# Patient Record
Sex: FEMALE | Race: AMERICAN INDIAN OR ALASKA NATIVE | Employment: UNEMPLOYED | URBAN - METROPOLITAN AREA
[De-identification: names, ages, dates, MRNs, and addresses within clinical notes are randomized per-mention and may not be internally consistent; named-entity substitution may affect disease eponyms.]

---

## 2021-11-22 ENCOUNTER — HOSPITAL ENCOUNTER (EMERGENCY)
Facility: HOSPITAL | Age: 46
Discharge: HOME | End: 2021-11-22
Attending: EMERGENCY MEDICINE | Admitting: EMERGENCY MEDICINE
Payer: COMMERCIAL

## 2021-11-22 ENCOUNTER — APPOINTMENT (OUTPATIENT)
Dept: CT IMAGING | Facility: HOSPITAL | Age: 46
End: 2021-11-22
Attending: EMERGENCY MEDICINE
Payer: COMMERCIAL

## 2021-11-22 VITALS
SYSTOLIC BLOOD PRESSURE: 119 MMHG | DIASTOLIC BLOOD PRESSURE: 75 MMHG | WEIGHT: 167 LBS | TEMPERATURE: 97.9 F | OXYGEN SATURATION: 93 % | RESPIRATION RATE: 20 BRPM | HEART RATE: 85 BPM

## 2021-11-22 DIAGNOSIS — R10.13 EPIGASTRIC PAIN: ICD-10-CM

## 2021-11-22 DIAGNOSIS — R11.2 NAUSEA AND VOMITING, INTRACTABILITY OF VOMITING NOT SPECIFIED, UNSPECIFIED VOMITING TYPE: ICD-10-CM

## 2021-11-22 DIAGNOSIS — K44.9 HIATAL HERNIA: ICD-10-CM

## 2021-11-22 LAB
ALBUMIN SERPL-MCNC: 4 G/DL (ref 3.5–5)
ALP SERPL-CCNC: 117 U/L (ref 45–120)
ALT SERPL W P-5'-P-CCNC: 16 U/L (ref 0–45)
ANION GAP SERPL CALCULATED.3IONS-SCNC: 12 MMOL/L (ref 5–18)
AST SERPL W P-5'-P-CCNC: 15 U/L (ref 0–40)
BASE EXCESS BLDV CALC-SCNC: 9.7 MMOL/L
BASOPHILS # BLD AUTO: 0 10E3/UL (ref 0–0.2)
BASOPHILS NFR BLD AUTO: 0 %
BILIRUB DIRECT SERPL-MCNC: 0.2 MG/DL
BILIRUB SERPL-MCNC: 0.6 MG/DL (ref 0–1)
BUN SERPL-MCNC: 13 MG/DL (ref 8–22)
C REACTIVE PROTEIN LHE: 1 MG/DL (ref 0–0.8)
CALCIUM SERPL-MCNC: 9.7 MG/DL (ref 8.5–10.5)
CHLORIDE BLD-SCNC: 96 MMOL/L (ref 98–107)
CO2 SERPL-SCNC: 30 MMOL/L (ref 22–31)
CREAT SERPL-MCNC: 0.84 MG/DL (ref 0.6–1.1)
EOSINOPHIL # BLD AUTO: 0.1 10E3/UL (ref 0–0.7)
EOSINOPHIL NFR BLD AUTO: 1 %
ERYTHROCYTE [DISTWIDTH] IN BLOOD BY AUTOMATED COUNT: 14.6 % (ref 10–15)
GFR SERPL CREATININE-BSD FRML MDRD: 83 ML/MIN/1.73M2
GLUCOSE BLD-MCNC: 160 MG/DL (ref 70–125)
HCG SERPL QL: NEGATIVE
HCO3 BLDV-SCNC: 32 MMOL/L (ref 24–30)
HCT VFR BLD AUTO: 47 % (ref 35–47)
HGB BLD-MCNC: 15.3 G/DL (ref 11.7–15.7)
IMM GRANULOCYTES # BLD: 0 10E3/UL
IMM GRANULOCYTES NFR BLD: 0 %
KETONES BLD-SCNC: 1.13 MMOL/L
LIPASE SERPL-CCNC: 51 U/L (ref 0–52)
LYMPHOCYTES # BLD AUTO: 3.7 10E3/UL (ref 0.8–5.3)
LYMPHOCYTES NFR BLD AUTO: 31 %
MAGNESIUM SERPL-MCNC: 1.7 MG/DL (ref 1.8–2.6)
MCH RBC QN AUTO: 26.4 PG (ref 26.5–33)
MCHC RBC AUTO-ENTMCNC: 32.6 G/DL (ref 31.5–36.5)
MCV RBC AUTO: 81 FL (ref 78–100)
MONOCYTES # BLD AUTO: 0.6 10E3/UL (ref 0–1.3)
MONOCYTES NFR BLD AUTO: 5 %
NEUTROPHILS # BLD AUTO: 7.6 10E3/UL (ref 1.6–8.3)
NEUTROPHILS NFR BLD AUTO: 63 %
NRBC # BLD AUTO: 0 10E3/UL
NRBC BLD AUTO-RTO: 0 /100
OXYHGB MFR BLDV: 65.6 % (ref 70–75)
PCO2 BLDV: 42 MM HG (ref 35–50)
PH BLDV: 7.5 [PH] (ref 7.35–7.45)
PLATELET # BLD AUTO: 383 10E3/UL (ref 150–450)
PO2 BLDV: 33 MM HG (ref 25–47)
POTASSIUM BLD-SCNC: 3.4 MMOL/L (ref 3.5–5)
PROT SERPL-MCNC: 7.7 G/DL (ref 6–8)
RBC # BLD AUTO: 5.8 10E6/UL (ref 3.8–5.2)
SAO2 % BLDV: 67.1 % (ref 70–75)
SODIUM SERPL-SCNC: 138 MMOL/L (ref 136–145)
TSH SERPL DL<=0.005 MIU/L-ACNC: 1.11 UIU/ML (ref 0.3–5)
WBC # BLD AUTO: 12 10E3/UL (ref 4–11)

## 2021-11-22 PROCEDURE — 83735 ASSAY OF MAGNESIUM: CPT | Performed by: EMERGENCY MEDICINE

## 2021-11-22 PROCEDURE — 36415 COLL VENOUS BLD VENIPUNCTURE: CPT | Performed by: EMERGENCY MEDICINE

## 2021-11-22 PROCEDURE — 96361 HYDRATE IV INFUSION ADD-ON: CPT

## 2021-11-22 PROCEDURE — 82248 BILIRUBIN DIRECT: CPT | Performed by: EMERGENCY MEDICINE

## 2021-11-22 PROCEDURE — 86141 C-REACTIVE PROTEIN HS: CPT | Performed by: EMERGENCY MEDICINE

## 2021-11-22 PROCEDURE — 84443 ASSAY THYROID STIM HORMONE: CPT | Mod: GZ | Performed by: EMERGENCY MEDICINE

## 2021-11-22 PROCEDURE — 250N000013 HC RX MED GY IP 250 OP 250 PS 637: Performed by: EMERGENCY MEDICINE

## 2021-11-22 PROCEDURE — 96375 TX/PRO/DX INJ NEW DRUG ADDON: CPT

## 2021-11-22 PROCEDURE — 93005 ELECTROCARDIOGRAM TRACING: CPT | Performed by: EMERGENCY MEDICINE

## 2021-11-22 PROCEDURE — 99285 EMERGENCY DEPT VISIT HI MDM: CPT | Mod: 25

## 2021-11-22 PROCEDURE — 84703 CHORIONIC GONADOTROPIN ASSAY: CPT | Performed by: EMERGENCY MEDICINE

## 2021-11-22 PROCEDURE — 250N000011 HC RX IP 250 OP 636: Performed by: EMERGENCY MEDICINE

## 2021-11-22 PROCEDURE — 80053 COMPREHEN METABOLIC PANEL: CPT | Performed by: EMERGENCY MEDICINE

## 2021-11-22 PROCEDURE — 258N000003 HC RX IP 258 OP 636: Performed by: EMERGENCY MEDICINE

## 2021-11-22 PROCEDURE — 82805 BLOOD GASES W/O2 SATURATION: CPT | Performed by: EMERGENCY MEDICINE

## 2021-11-22 PROCEDURE — 250N000009 HC RX 250: Performed by: EMERGENCY MEDICINE

## 2021-11-22 PROCEDURE — 74177 CT ABD & PELVIS W/CONTRAST: CPT

## 2021-11-22 PROCEDURE — 96374 THER/PROPH/DIAG INJ IV PUSH: CPT | Mod: 59

## 2021-11-22 PROCEDURE — 85025 COMPLETE CBC W/AUTO DIFF WBC: CPT | Performed by: EMERGENCY MEDICINE

## 2021-11-22 PROCEDURE — 82010 KETONE BODYS QUAN: CPT | Performed by: EMERGENCY MEDICINE

## 2021-11-22 PROCEDURE — 83690 ASSAY OF LIPASE: CPT | Performed by: EMERGENCY MEDICINE

## 2021-11-22 RX ORDER — HALOPERIDOL 5 MG/ML
2.5 INJECTION INTRAMUSCULAR ONCE
Status: COMPLETED | OUTPATIENT
Start: 2021-11-22 | End: 2021-11-22

## 2021-11-22 RX ORDER — IOPAMIDOL 755 MG/ML
100 INJECTION, SOLUTION INTRAVASCULAR ONCE
Status: COMPLETED | OUTPATIENT
Start: 2021-11-22 | End: 2021-11-22

## 2021-11-22 RX ORDER — METOCLOPRAMIDE 10 MG/1
10 TABLET ORAL 4 TIMES DAILY PRN
Qty: 21 TABLET | Refills: 0 | Status: SHIPPED | OUTPATIENT
Start: 2021-11-22

## 2021-11-22 RX ORDER — PANTOPRAZOLE SODIUM 40 MG/1
40 TABLET, DELAYED RELEASE ORAL DAILY
Qty: 30 TABLET | Refills: 0 | Status: SHIPPED | OUTPATIENT
Start: 2021-11-22 | End: 2021-12-22

## 2021-11-22 RX ORDER — PROMETHAZINE HYDROCHLORIDE 25 MG/1
25 SUPPOSITORY RECTAL EVERY 6 HOURS PRN
Qty: 21 SUPPOSITORY | Refills: 0 | Status: SHIPPED | OUTPATIENT
Start: 2021-11-22

## 2021-11-22 RX ORDER — ONDANSETRON 2 MG/ML
4 INJECTION INTRAMUSCULAR; INTRAVENOUS ONCE
Status: COMPLETED | OUTPATIENT
Start: 2021-11-22 | End: 2021-11-22

## 2021-11-22 RX ADMIN — HYDROMORPHONE HYDROCHLORIDE 0.5 MG: 1 INJECTION, SOLUTION INTRAMUSCULAR; INTRAVENOUS; SUBCUTANEOUS at 21:27

## 2021-11-22 RX ADMIN — IOPAMIDOL 100 ML: 755 INJECTION, SOLUTION INTRAVENOUS at 20:31

## 2021-11-22 RX ADMIN — ONDANSETRON 4 MG: 2 INJECTION INTRAMUSCULAR; INTRAVENOUS at 18:56

## 2021-11-22 RX ADMIN — HALOPERIDOL LACTATE 2.5 MG: 5 INJECTION, SOLUTION INTRAMUSCULAR at 20:59

## 2021-11-22 RX ADMIN — LIDOCAINE HYDROCHLORIDE 30 ML: 20 SOLUTION ORAL; TOPICAL at 21:51

## 2021-11-22 RX ADMIN — SODIUM CHLORIDE, POTASSIUM CHLORIDE, SODIUM LACTATE AND CALCIUM CHLORIDE 1000 ML: 600; 310; 30; 20 INJECTION, SOLUTION INTRAVENOUS at 21:00

## 2021-11-22 RX ADMIN — FAMOTIDINE 20 MG: 10 INJECTION, SOLUTION INTRAVENOUS at 18:59

## 2021-11-22 ASSESSMENT — ENCOUNTER SYMPTOMS
FEVER: 1
JOINT SWELLING: 0
NAUSEA: 1
CONFUSION: 0
SHORTNESS OF BREATH: 0
SORE THROAT: 0
DIZZINESS: 0
ABDOMINAL PAIN: 1
ROS GI COMMENTS: POSITIVE FOR LOOSE STOOLS.
DYSURIA: 0
CHILLS: 0
VOMITING: 1
HEMATURIA: 0

## 2021-11-23 LAB
ATRIAL RATE - MUSE: 79 BPM
DIASTOLIC BLOOD PRESSURE - MUSE: NORMAL MMHG
INTERPRETATION ECG - MUSE: NORMAL
P AXIS - MUSE: 49 DEGREES
PR INTERVAL - MUSE: 118 MS
QRS DURATION - MUSE: 76 MS
QT - MUSE: 394 MS
QTC - MUSE: 451 MS
R AXIS - MUSE: 64 DEGREES
SYSTOLIC BLOOD PRESSURE - MUSE: NORMAL MMHG
T AXIS - MUSE: 35 DEGREES
VENTRICULAR RATE- MUSE: 79 BPM

## 2021-11-23 NOTE — ED PROVIDER NOTES
ED Triage Provider Note  [facility]  Encounter Date: Nov 22, 2021      The patient was seen in triage to expedite ED workup.     History:  Chief Complaint   Patient presents with     Abdominal Pain     Alethea Alexandre is a 46 year old female who presents to the ED with abdominal pain, vomiting.     Patient reports developing abdominal pain which has been ongoing for the past 2 weeks. Reports she [place] [facility]. No constipation. No radiation of pain. Pain is not provoked with deep inspiration. Pain is not palliated by changes in position. Patient has an upcoming appointment with her PCP on Wednesday. Pain is constant and localized to her epigastric region. Reports her pain is getting progressively worse. Reports associated nausea, vomiting, loose stools. Had 2 loose bowel movements today. Reports an associated subjective fever of 100F. No cough. No history of surgery. Reports latex, diflucan allergies.    History of diabetes mellitus type 2 on insulin, stroke, fibromyalgia. Takes metoprolol, gabapentin, senna, baby Asprin, duloxetine, omeprazole. No alcohol or other drug use    I, Kevin Hatfield, am serving as a scribe to document services personally performed by Andrew Stinson MD, based on my observations and the provider's statements to me.  I, Andrew Stinson MD, attest that Kevin Hatfield is acting in a scribe capacity, has observed my performance of the services and has documented them in accordance with my direction.     Review of Systems:  Review of Systems   Constitutional: Positive for fever (100F).   Gastrointestinal: Positive for abdominal pain (persistent, epigastric), nausea and vomiting.        Positive for loose stools.         Vitals:  /82   Pulse 104   Temp 97.9  F (36.6  C) (Oral)   Resp 20   Wt 75.8 kg (167 lb)   SpO2 99%     Brief Exam:  Crying on exam with mild focal epigastric tenderness with no peritoneal signs, normal work of breathing,  "moves all limbs with nonfocal neuro exam.    Medical Decision Making:  Patient arriving to the ED with problem as above. A medical screening exam was performed. Orders initiated from triage (CT, EKG, blood) to expedite ED workup.    ED Course as of 11/22/21 1827 Mon Nov 22, 2021   1819 46yoF with a history of T2DM (takes insulin), HLD, HTN, no prior abdominal surgeries, chronic methadone use who reports being [place] [facility] [place] with intractable abdominal pain (\"they didn't do anything for me\") presenting with constant epigastric pain x2 weeks with nausea & persistent vomiting. Reports persistent focal nonradiating epigastric pain worse after eating with ongoing vomiting. States she has follow up scheduled in clinic in 2 days but couldn't wait that long. Came to the ED for evaluation; states wait was too long at [facility] so came here to [facility].    HR 100s on presentation with otherwise normal vitals. Crying on exam with mild focal epigastric tenderness with no peritoneal signs, normal work of breathing, moves all limbs with nonfocal neuro exam. CT, blood, EKG ordered from triage to expedite workup along with IVF, Pepcid, Zofran, GI cocktail.       The patient is appropriate to wait in triage until ED room available.    Andrew Stinson MD  11/22/21  Emergency Medicine  [facility] EMERGENCY DEPARTMENT  [address] [place] [id number]  [phone]  Dept: [phone]     Andrew Stinson MD  11/22/21 1828    "

## 2021-11-23 NOTE — DISCHARGE INSTRUCTIONS
Call your GI doctor to follow up for outpatient upper endoscopy.  Stop omeprazole and start protonix.  Take previous carafate as well.  Take reglan or phenergan suppositories for nausea.

## 2021-11-23 NOTE — ED PROVIDER NOTES
Emergency Department Encounter     Evaluation Date & Time:   11/22/2021  8:24 PM    CHIEF COMPLAINT:  Abdominal Pain      Triage Note:Pt to triage via w/c c/o epigastric pain that has been constant x 2 wks with nausea and vomiting. Pt states she was hospitalized at [facility] at the onset, and has a f/u appt scheduled in clinic this coming Wednesday. Pt has been unable to keep anything down, so she took an ambulance to [facility] today but waited 5 hrs to be seen so she left.        ED COURSE & MEDICAL DECISION MAKING:     ED Course as of 11/22/21 2211 Mon Nov 22, 2021 2055 LFTs, lipase wnl.  Hcg negative. WBC 12.0.  CRP 1.  No signs of DKA.  Renal function intact/well.  Will continue IVF, IV haldol ordered for this as well. CT abd/pelvis pending.   2201 CT reviewed.  It shows known uterine fibroid, which was seen as far back as an Ultrasound in 2019 per care everywhere.  Small hiatal hernia, likely at least partially the cause of her symptoms.  Otherwise no acute pathology.   2202 Pt re-evaluated, updated and feeling better overall. Agreeable to discharge. She has carafate already sent by [facility], but has not picked up. She will start this and switch PPI from omeprazole to protonix, follow up with her GI doctor for outpatient upper endoscopy.  Pt had follow up with them and will discuss upper endoscopy regarding hiatal hernia.     Pt here with ongoing epigastric pain with n/v for 2.5 weeks. She is here with her mother-in-law and PCA who is talking over her at times, but pt otherwise able to give history. Recently seen at [facility] for same, told she likely has gastritis/GERD and needed endoscopy with GI.  Pt taking zofran for symptoms at home with tramadol and methadone.  Pt with relatively benign abdomen, reassuring vitals and labs from triage reviewed and overall unremarkable.  Awaiting CT abd/pelvis.      8:43 PM I met with the patient, obtained history, performed an initial exam, and discussed options and plan for  diagnostics and treatment here in the ED.       At the conclusion of the encounter I discussed the results of all the tests and the disposition. The questions were answered. The patient or family acknowledged understanding and was agreeable with the care plan.      MEDICATIONS GIVEN IN THE EMERGENCY DEPARTMENT:  Medications   lactated ringers BOLUS 1,000 mL (has no administration in time range)   famotidine (PEPCID) injection 20 mg (20 mg Intravenous Given 11/22/21 1859)   lidocaine (XYLOCAINE) 2 % 15 mL, alum & mag hydroxide-simethicone (MAALOX) 15 mL GI Cocktail (30 mLs Oral Given 11/22/21 2151)   ondansetron (ZOFRAN) injection 4 mg (4 mg Intravenous Given 11/22/21 1856)   lactated ringers BOLUS 1,000 mL (1,000 mLs Intravenous New Bag 11/22/21 2100)   iopamidol (ISOVUE-370) solution 100 mL (100 mLs Intravenous Given 11/22/21 2031)   haloperidol lactate (HALDOL) injection 2.5 mg (2.5 mg Intravenous Given 11/22/21 2059)   HYDROmorphone (DILAUDID) injection 0.5 mg (0.5 mg Intravenous Given 11/22/21 2127)       NEW PRESCRIPTIONS STARTED AT TODAY'S ED VISIT:  New Prescriptions    METOCLOPRAMIDE (REGLAN) 10 MG TABLET    Take 1 tablet (10 mg) by mouth 4 times daily as needed    PANTOPRAZOLE (PROTONIX) 40 MG EC TABLET    Take 1 tablet (40 mg) by mouth daily for 30 doses    PROMETHAZINE (PHENERGAN) 25 MG SUPPOSITORY    Place 1 suppository (25 mg) rectally every 6 hours as needed for nausea       HPI   The history is provided by the patient and a caregiver (PCA).        Alethea Alexandre is a 46 year old female with a pertinent history of T2DM, GERD, and polysubstance abuse who presents to this ED by wheelchair for evaluation of abdominal pain.    The patient's PCA (in-law) was in the room, reported the patient has had nausea, vomiting and abdominal pain for the past 2.5 weeks. [facility] and is hoping to get an upper endoscopy. The patient has not had anything to drink today and has decreased urine  output. Patient has not had pain like this before, though she has had multiple severe episodes of nausea and vomiting from her acid reflux. PCA endorses the patient has recently had loose stools. She endorses fever of 100F yesterday. Patient was given zofran, tramadol, and methadone. She denies any other current complaints.       REVIEW OF SYSTEMS:  Review of Systems   Constitutional: Positive for fever. Negative for chills.   HENT: Negative for sore throat.    Eyes: Negative for visual disturbance.   Respiratory: Negative for shortness of breath.    Cardiovascular: Negative for chest pain.   Gastrointestinal: Positive for abdominal pain, nausea and vomiting.   Endocrine: Negative for polyuria.   Genitourinary: Positive for decreased urine volume. Negative for dysuria and hematuria.        - urinary changes     Musculoskeletal: Negative for joint swelling.   Skin: Negative for rash.   Neurological: Negative for dizziness.   Psychiatric/Behavioral: Negative for confusion.   All other systems reviewed and are negative.          Medical History   No past medical history on file.    No past surgical history on file.    No family history on file.    Social History     Tobacco Use     Smoking status: Not on file     Smokeless tobacco: Not on file   Substance Use Topics     Alcohol use: Not on file     Drug use: Not on file       metoclopramide (REGLAN) 10 MG tablet  pantoprazole (PROTONIX) 40 MG EC tablet  promethazine (PHENERGAN) 25 MG suppository        Physical Exam     Triage Vitals:  ED Triage Vitals   Enc Vitals Group      BP 11/22/21 1823 138/82      Pulse 11/22/21 1820 104      Resp 11/22/21 1820 20      Temp 11/22/21 1820 97.9  F (36.6  C)      Temp src 11/22/21 1820 Oral      SpO2 11/22/21 1820 99 %      Weight 11/22/21 1821 75.8 kg (167 lb)      Height --       Head Circumference --       Peak Flow --       Pain Score --       Pain Loc --       Pain Edu? --       Excl. in GC? --         Vitals:  BP (!) 164/92    Pulse 83   Temp 97.9  F (36.6  C) (Oral)   Resp 20   Wt 75.8 kg (167 lb)   SpO2 97%     PHYSICAL EXAM:   Physical Exam  Vitals and nursing note reviewed.   Constitutional:       General: She is not in acute distress.     Appearance: Normal appearance.      Comments: Appears uncomfortable   HENT:      Head: Normocephalic and atraumatic.      Nose: Nose normal.      Mouth/Throat:      Mouth: Mucous membranes are moist.   Eyes:      Pupils: Pupils are equal, round, and reactive to light.   Cardiovascular:      Rate and Rhythm: Normal rate and regular rhythm.      Pulses: Normal pulses.           Radial pulses are 2+ on the right side and 2+ on the left side.        Dorsalis pedis pulses are 2+ on the right side and 2+ on the left side.   Pulmonary:      Effort: Pulmonary effort is normal. No respiratory distress.      Breath sounds: Normal breath sounds.   Abdominal:      Palpations: Abdomen is soft.      Tenderness: There is abdominal tenderness (mild - epigastrium). There is no guarding or rebound. Negative signs include Whalen's sign, Rovsing's sign and McBurney's sign.      Comments: No rigidity   Musculoskeletal:      Cervical back: Full passive range of motion without pain and neck supple.      Comments: No calf tenderness or swelling b/l   Skin:     General: Skin is warm.      Findings: No rash.   Neurological:      General: No focal deficit present.      Mental Status: She is alert. Mental status is at baseline.      Comments: Fluent speech, no acute lateralizing deficits   Psychiatric:         Mood and Affect: Mood normal.         Behavior: Behavior normal.           Results     LAB:  All pertinent labs reviewed and interpreted  Labs Ordered and Resulted from Time of ED Arrival to Time of ED Departure   BASIC METABOLIC PANEL - Abnormal       Result Value    Sodium 138      Potassium 3.4 (*)     Chloride 96 (*)     Carbon Dioxide (CO2) 30      Anion Gap 12      Urea Nitrogen 13      Creatinine 0.84       Calcium 9.7      Glucose 160 (*)     GFR Estimate 83     MAGNESIUM - Abnormal    Magnesium 1.7 (*)    CRP INFLAMMATION - Abnormal    CRP 1.0 (*)    BLOOD GAS VENOUS - Abnormal    pH Venous 7.50 (*)     pCO2 Venous 42      pO2 Venous 33      Bicarbonate Venous 32 (*)     Base Excess/Deficit (+/-) 9.7      Oxyhemoglobin Venous 65.6 (*)     O2 Sat, Venous 67.1 (*)    KETONE BETA-HYDROXYBUTYRATE QUANTITATIVE, RAPID - Abnormal    Ketone (Beta-Hydroxybutyrate) Quantitative 1.13 (*)    CBC WITH PLATELETS AND DIFFERENTIAL - Abnormal    WBC Count 12.0 (*)     RBC Count 5.80 (*)     Hemoglobin 15.3      Hematocrit 47.0      MCV 81      MCH 26.4 (*)     MCHC 32.6      RDW 14.6      Platelet Count 383      % Neutrophils 63      % Lymphocytes 31      % Monocytes 5      % Eosinophils 1      % Basophils 0      % Immature Granulocytes 0      NRBCs per 100 WBC 0      Absolute Neutrophils 7.6      Absolute Lymphocytes 3.7      Absolute Monocytes 0.6      Absolute Eosinophils 0.1      Absolute Basophils 0.0      Absolute Immature Granulocytes 0.0      Absolute NRBCs 0.0     HEPATIC FUNCTION PANEL - Normal    Bilirubin Total 0.6      Bilirubin Direct 0.2      Protein Total 7.7      Albumin 4.0      Alkaline Phosphatase 117      AST 15      ALT 16     TSH WITH FREE T4 REFLEX - Normal    TSH 1.11     HCG QUALITATIVE PREGNANCY - Normal    hCG Serum Qualitative Negative     LIPASE - Normal    Lipase 51         RADIOLOGY:  CT Abdomen Pelvis w Contrast   Final Result   IMPRESSION:    1.  Uterine fundal 9.2 cm mass, likely a large leiomyoma. If further workup needed, ultrasound recommended initially.   2.  IUD.   3.  Small hiatal hernia.   4.  Normal-appearing bowel and appendix.                   ECG:  none    PROCEDURES:  Procedures:  none      FINAL IMPRESSION:    ICD-10-CM    1. Nausea and vomiting, intractability of vomiting not specified, unspecified vomiting type  R11.2    2. Epigastric pain  R10.13    3. Hiatal hernia  K44.9        0  minutes of critical care time      I, Isabel Jones, am serving as a scribe to document services personally performed by Dr. Jaspreet Guzmán, based on my observations and the provider's statements to me. I, Jaspreet Guzmán, DO attest that Isabel Jones is acting in a scribe capacity, has observed my performance of the services and has documented them in accordance with my direction.      Jaspreet Guzmán DO  Emergency Medicine  [facility] EMERGENCY DEPARTMENT  11/22/2021  8:35 PM        Jaspreet Guzmán MD  11/22/21 4895

## 2021-11-23 NOTE — ED NOTES
Pt requesting medication for pain as she has been unable to take her home pain meds today. Message relayed to Dr. Guzmán.

## 2021-11-23 NOTE — ED TRIAGE NOTES
Pt to triage via w/c c/o epigastric pain that has been constant x 2 wks with nausea and vomiting. Pt states she was [place] [facility] [place], and has a f/u appt scheduled in clinic this coming Wednesday. Pt has been unable to keep anything down, so she took an ambulance to [facility] today but waited 5 hrs to be seen so she left.

## 2021-12-29 ENCOUNTER — HOSPITAL ENCOUNTER (EMERGENCY)
Facility: HOSPITAL | Age: 46
Discharge: HOME | End: 2021-12-29
Attending: EMERGENCY MEDICINE | Admitting: EMERGENCY MEDICINE
Payer: COMMERCIAL

## 2021-12-29 VITALS
HEART RATE: 81 BPM | OXYGEN SATURATION: 97 % | DIASTOLIC BLOOD PRESSURE: 68 MMHG | TEMPERATURE: 97.3 F | HEIGHT: 63 IN | BODY MASS INDEX: 33.84 KG/M2 | RESPIRATION RATE: 18 BRPM | SYSTOLIC BLOOD PRESSURE: 129 MMHG | WEIGHT: 191 LBS

## 2021-12-29 DIAGNOSIS — Z20.822 SUSPECTED 2019 NOVEL CORONAVIRUS INFECTION: ICD-10-CM

## 2021-12-29 LAB
FLUAV RNA SPEC QL NAA+PROBE: NEGATIVE
FLUBV RNA RESP QL NAA+PROBE: NEGATIVE
SARS-COV-2 RNA RESP QL NAA+PROBE: POSITIVE

## 2021-12-29 PROCEDURE — 87636 SARSCOV2 & INF A&B AMP PRB: CPT | Performed by: EMERGENCY MEDICINE

## 2021-12-29 PROCEDURE — C9803 HOPD COVID-19 SPEC COLLECT: HCPCS

## 2021-12-29 PROCEDURE — 99283 EMERGENCY DEPT VISIT LOW MDM: CPT

## 2021-12-29 ASSESSMENT — ENCOUNTER SYMPTOMS
COUGH: 1
FEVER: 0
HEADACHES: 1
MYALGIAS: 1

## 2021-12-29 ASSESSMENT — MIFFLIN-ST. JEOR: SCORE: 1470.5

## 2021-12-29 NOTE — ED PROVIDER NOTES
1:29 PM.    Subjective: Patient with 3 days of Covid symptoms specifically cough and achiness.  Afebrile.  Vaccinated.    Objective: Dry cough is noted.  Patient notes occasional yellowish phlegm.  Afebrile now.    Assessment: Viral syndrome symptoms except for fever.  Known exposure to family member with Covid.    Plan: Covid testing and chest x-ray ordered.       Jaspreet Meade MD  12/29/21 5828    3:45 PM.  Informed the patient refused chest x-ray.  Covid testing still pending.       Jaspreet Meade MD  12/29/21 7455

## 2021-12-29 NOTE — DISCHARGE INSTRUCTIONS
Your Covid test today in the ER is pending, but with exposure it might be positive.  If positie, it is now important that you isolate at home and do not leave your house or exposure self to any other people until it has been at least 10 days since your symptoms started AND you have been feeling improved for at least 3 days.  This means if you do not start to feel better until day 9, you can come out of isolation until day 12.    Ibuprofen and Tylenol are safe for fever or aches at home.  Get lots of rest and drink fluids.  It can be helpful to get a pulse oximeter and follow your oxygen levels at home.  If you are feeling significantly short of breath or your oxygen levels dropped into the 80s, these would be a reason to come back to the hospital.      Even after Covid infection, you can get your Covid booster when it has been 2 weeks.

## 2021-12-29 NOTE — ED PROVIDER NOTES
EMERGENCY DEPARTMENT ENCOUNTER     NAME: Alethea Alexandre   AGE: 46 year old female   YOB: 1975   MRN: [id number]   EVALUATION DATE & TIME: No admission date for patient encounter.   PCP: Carlos North     Chief Complaint   Patient presents with     Covid Concern   :    FINAL IMPRESSION     1. Suspected 2019 novel coronavirus infection         ED COURSE & MEDICAL DECISION MAKING      Pertinent Labs & Imaging studies reviewed. (See chart for details)   46 year old female  presents to the Emergency Department for evaluation of 2 days of Covid symptoms.  Patient is being seen here with several other family members after family exposure. Initial Vitals Reviewed. Initial exam notable for well-appearing patient normal vitals.  Chest x-ray was ordered by the triage doctor but with clear lungs, normal vitals, no fever I do not suspect bacterial pneumonia.  Covid and influenza testing was sent and pending at time of disposition.  We discussed home supportive care, isolation instructions, return precautions, when she can get her booster and she was discharged in stable condition.        4:16 PM I met with the patient, obtained history, performed an initial exam, and discussed options and plan for diagnostics and treatment here in the ED. Discussed plan for discharge - patient agreeable. PPE worn: N95, eye protection, gloves.    At the conclusion of the encounter I discussed the results of all of the tests and the disposition. The questions were answered. The patient or family acknowledged understanding and was agreeable with the care plan.         MEDICATIONS GIVEN IN THE EMERGENCY:   Medications - No data to display   NEW PRESCRIPTIONS STARTED AT TODAY'S ER VISIT   New Prescriptions    No medications on file     ================================================================   HISTORY OF PRESENT ILLNESS     Patient information was obtained from: Patient   Use of Intrepreter: N/A  Alethea Alexandre is a 46  year old female who presents for evaluation of Covid concern. Is vaccinated against Covid. Symptoms started 3 days ago including body aches, headache, occasionally productive cough. No fever. She has been exposed to family who was positive for Covid. No other concerns at this time.    ================================================================    REVIEW OF SYSTEMS     Review of Systems   Constitutional: Negative for fever.   Respiratory: Positive for cough (occasionally productive).    Musculoskeletal: Positive for myalgias.   Neurological: Positive for headaches.   All other systems reviewed and are negative.      PAST HISTORY     PAST MEDICAL HISTORY:   History reviewed. No pertinent past medical history.   PAST SURGICAL HISTORY:   History reviewed. No pertinent surgical history.   CURRENT MEDICATIONS:   metoclopramide (REGLAN) 10 MG tablet  promethazine (PHENERGAN) 25 MG suppository      ALLERGIES:   Allergies   Allergen Reactions     Diflucan [Fluconazole] Shortness Of Breath, Hives and Rash     Latex       FAMILY HISTORY:   History reviewed. No pertinent family history.   SOCIAL HISTORY:   Social History     Socioeconomic History     Marital status: Single     Spouse name: Not on file     Number of children: Not on file     Years of education: Not on file     Highest education level: Not on file   Occupational History     Not on file   Tobacco Use     Smoking status: Not on file     Smokeless tobacco: Not on file   Substance and Sexual Activity     Alcohol use: Not on file     Drug use: Not on file     Sexual activity: Not on file   Other Topics Concern     Not on file   Social History Narrative     Not on file     Social Determinants of Health     Financial Resource Strain: Not on file   Food Insecurity: Not on file   Transportation Needs: Not on file   Physical Activity: Not on file   Stress: Not on file   Social Connections: Not on file   Intimate Partner Violence: Not on file   Housing Stability: Not  "on file        VITALS  Patient Vitals for the past 24 hrs:   BP Temp Temp src Pulse Resp SpO2 Height Weight   12/29/21 1329 129/68 97.3  F (36.3  C) Temporal 81 18 97 % 1.6 m (5' 3\") 86.6 kg (191 lb)     ================================================================    PHYSICAL EXAM     VITAL SIGNS: /68   Pulse 81   Temp 97.3  F (36.3  C) (Temporal)   Resp 18   Ht 1.6 m (5' 3\")   Wt 86.6 kg (191 lb)   LMP 12/24/2021   SpO2 97%   BMI 33.83 kg/m     Constitutional:  Awake, no acute distress   HENT:  Atraumatic, oropharynx without exudate or erythema, membranes moist  Lymph:  No adenopathy  Eyes: EOM intact, PERRL, no injection  Neck: Supple  Respiratory:  Clear to auscultation bilaterally, no wheezes or crackles   Cardiovascular:  Regular rate and rhythm, single S1 and S2   GI:  Soft, nontender, nondistended, no rebound or guarding   Musculoskeletal:  Moves all extremities, no lower extremity edema, no deformities    Skin:  Warm, dry  Neurologic:  Alert and oriented x3, no focal deficits noted     ================================================================  LAB     All pertinent labs reviewed and interpreted.   Labs Ordered and Resulted from Time of ED Arrival to Time of ED Departure - No data to display     ===============================================================  RADIOLOGY     Reviewed all pertinent imaging. Please see official radiology report.   No orders to display     ================================================================      I, Milind Sotelo, am serving as a scribe to document services personally performed by Dr. Ma based on my observation and the provider's statements to me. I, Sunitha Ma MD attest that Milind Sotelo is acting in a scribe capacity, has observed my performance of the services and has documented them in accordance with my direction.    Sunitha Ma M.D.   Emergency Medicine   [facility] EMERGENCY " DEPARTMENT  [address] [place] [id number]  [phone]  Dept: [phone]      Sunitha Ma MD  12/29/21 3022

## 2021-12-30 ENCOUNTER — TELEPHONE (OUTPATIENT)
Dept: EMERGENCY MEDICINE | Facility: HOSPITAL | Age: 46
End: 2021-12-30
Payer: COMMERCIAL

## 2021-12-30 NOTE — TELEPHONE ENCOUNTER
"Coronavirus (COVID-19) Notification    Caller Name (Patient, parent, daughter/son, grandparent, etc)  patient    Reason for call  Notify of Positive Coronavirus (COVID-19) lab results, assess symptoms,  review [facility] recommendations    Lab Result    Lab test:  2019-nCoV rRt-PCR or SARS-CoV-2 PCR    Oropharyngeal AND/OR nasopharyngeal swabs is POSITIVE for 2019-nCoV RNA/SARS-COV-2 PCR (COVID-19 virus)    RN Recommendations/Instructions per [facility] Coronavirus COVID-19 recommendations    Brief introduction script  Introduce self then review script:  \"I am calling on behalf of [facility] [employer].  We were notified that your Coronavirus test (COVID-19) for was POSITIVE for the virus.  I have some information to relay to you but first I wanted to mention that the [place] Dept of Health will be contacting you shortly [it's possible MD already called Patient] to talk to you more about how you are feeling and other people you have had contact with who might now also have the virus.  Also, [facility] is Partnering with the [facility] for Covid-19 research, you may be contacted directly by research staff.\"    Patient reports she is vaccinated for Covid with [employer].    Assessment (Inquire about Patient's current symptoms)   Assessment   Current Symptoms at time of phone call: (if no symptoms, document No symptoms] Headache, body aches, cough   Symptoms onset (if applicable) 12/24/2021     If at time of call, Patients symptoms hare worsened, the Patient should contact 911 or have someone drive them to Emergency Dept promptly:      If Patient calling 911, inform 911 personal that you have tested positive for the Coronavirus (COVID-19).  Place mask on and await 911 to arrive.    If Emergency Dept, If possible, please have another adult drive you to the Emergency Dept but you need to wear mask when in contact with other people.      Monoclonal Antibody Administration    You may be eligible to receive a " "new treatment with a monoclonal antibody for preventing hospitalization in patients at high risk for complications from COVID-19.   This medication is still experimental and available on a limited basis; it is given through an IV and must be given at an infusion center. Please note that not all people who are eligible will receive the medication since it is in limited supply.     Are you interested in being considered for this medication?  No.   Does the patient fit the criteria: Patient declined    If patient qualifies based on above criteria:  \"You will be contacted if you are selected to receive this treatment in the next 1-2 business days.   This is time sensitive and if you are not selected in the next 1-2 business days, you will not receive the medication.  If you do not receive a call to schedule, you have not been selected.\"      Review information with Patient    Your result was positive. This means you have COVID-19 (coronavirus).  We have sent you a letter that reviews the information that I'll be reviewing with you now.    How can I protect others?    If you have symptoms: stay home and away from others (self-isolate) until:    You've had no fever--and no medicine that reduces fever--for 1 full day (24 hours). And       Your other symptoms have gotten better. For example, your cough or breathing has improved. And     At least 10 days have passed since your symptoms started. (If you've been told by a doctor that you have a weak immune system, wait 20 days.)     If you don't have symptoms: Stay home and away from others (self-isolate) until at least 10 days have passed since your first positive COVID-19 test. (Date test collected)    During this time:    Stay in your own room, including for meals. Use your own bathroom if you can.    Stay away from others in your home. No hugging, kissing or shaking hands. No visitors.     Don't go to work, school or anywhere else.     Clean  high touch  surfaces often " (doorknobs, counters, handles, etc.). Use a household cleaning spray or wipes. You'll find a full list on the EPA website at www.epa.gov/pesticide-registration/list-n-disinfectants-use-against-sars-cov-2.     Cover your mouth and nose with a mask, tissue or other face covering to avoid spreading germs.    Wash your hands and face often with soap and water.    Make a list of people you have been in close contact with recently, even if either of you wore a face covering.   ; Start your list from 2 days before you became ill or had a positive test.  ; Include anyone that was within 6 feet of you for a cumulative total of 15 minutes or more in 24 hours. (Example: if you sat next to Mark for 5 minutes in the morning and 10 minutes in the afternoon, then you were in close contact for 15 minutes total that day. Mark would be added to your list.)    A public health worker will call or text you. It is important that you answer. They will ask you questions about possible exposures to COVID-19, such as people you have been in direct contact with and places you have visited.    Tell the people on your list that you have COVID-19; they should stay away from others for 14 days starting from the last time they were in contact with you (unless you are told something different from a public health worker).     Caregivers in these groups are at risk for severe illness due to COVID-19:  o People 65 years and older  o People who live in a nursing home or long-term care facility  o People with chronic disease (lung, heart, cancer, diabetes, kidney, liver, immunologic)  o People who have a weakened immune system, including those who:  - Are in cancer treatment  - Take medicine that weakens the immune system, such as corticosteroids  - Had a bone marrow or organ transplant  - Have an immune deficiency  - Have poorly controlled HIV or AIDS  - Are obese (body mass index of 40 or higher)  - Smoke regularly    Caregivers should wear gloves  while washing dishes, handling laundry and cleaning bedrooms and bathrooms.    Wash and dry laundry with special caution. Don't shake dirty laundry, and use the warmest water setting you can.    If you have a weakened immune system, ask your doctor about other actions you should take.    For more tips, go to www.cdc.gov/coronavirus/2019-ncov/downloads/10Things.pdf.    You should not go back to work until you meet the guidelines above for ending your home isolation. You don't need to be retested for COVID-19 before going back to work--studies show that you won't spread the virus if it's been at least 10 days since your symptoms started (or 20 days, if you have a weak immune system).    Employers: This document serves as formal notice of your employee's medical guidelines for going back to work. They must meet the above guidelines before going back to work in person.    How can I take care of myself?    1. Get lots of rest. Drink extra fluids (unless a doctor has told you not to).    2. Take Tylenol (acetaminophen) for fever or pain. If you have liver or kidney problems, ask your family doctor if it's okay to take Tylenol.     Take either:     650 mg (two 325 mg pills) every 4 to 6 hours, or     1,000 mg (two 500 mg pills) every 8 hours as needed.     Note: Don't take more than 3,000 mg in one day. Acetaminophen is found in many medicines (both prescribed and over-the-counter medicines). Read all labels to be sure you don't take too much.    For children, check the Tylenol bottle for the right dose (based on their age or weight).    3. If you have other health problems (like cancer, heart failure, an organ transplant or severe kidney disease): Call your specialty clinic if you don't feel better in the next 2 days.    4. Know when to call 911: Emergency warning signs include:    Trouble breathing or shortness of breath    Pain or pressure in the chest that doesn't go away    Feeling confused like you haven't felt  before, or not being able to wake up    Bluish-colored lips or face    5. Sign up for [employer]. We know it's scary to hear that you have COVID-19. We want to track your symptoms to make sure you're okay over the next 2 weeks. Please look for an email from [employer]--this is a free, online program that we'll use to keep in touch. To sign up, follow the link in the email. Learn more at [email or web address].    Where can I get more information?    [facility]: [email or web address]    Coronavirus Basics: [email or web address]/[email or web address]/basics.html    What to Do If You're Sick: www.cdc.gov/coronavirus/2019-ncov/about/steps-when-sick.html    Ending Home Isolation: www.cdc.gov/coronavirus/2019-ncov/hcp/disposition-in-home-patients.html     Caring for Someone with COVID-19: www.cdc.gov/coronavirus/2019-ncov/if-you-are-sick/care-for-someone.html     [facility] clinical trials (COVID-19 research studies): [email or web address]     A Positive COVID-19 letter will be sent via [employer] or the mail. (Exception, no letters sent to Presurgerical/Preprocedure Patients)    Janet Frost LPN